# Patient Record
Sex: FEMALE | Race: OTHER | Employment: PART TIME | URBAN - METROPOLITAN AREA
[De-identification: names, ages, dates, MRNs, and addresses within clinical notes are randomized per-mention and may not be internally consistent; named-entity substitution may affect disease eponyms.]

---

## 2020-06-09 ENCOUNTER — HOSPITAL ENCOUNTER (EMERGENCY)
Age: 45
Discharge: HOME | End: 2020-06-09
Attending: EMERGENCY MEDICINE
Payer: SELF-PAY

## 2020-06-09 ENCOUNTER — APPOINTMENT (OUTPATIENT)
Dept: GENERAL RADIOLOGY | Age: 45
End: 2020-06-09
Attending: EMERGENCY MEDICINE
Payer: SELF-PAY

## 2020-06-09 VITALS
DIASTOLIC BLOOD PRESSURE: 75 MMHG | SYSTOLIC BLOOD PRESSURE: 127 MMHG | RESPIRATION RATE: 16 BRPM | TEMPERATURE: 99.8 F | HEIGHT: 60 IN | OXYGEN SATURATION: 99 % | HEART RATE: 97 BPM | WEIGHT: 140 LBS | BODY MASS INDEX: 27.48 KG/M2

## 2020-06-09 DIAGNOSIS — B34.9 VIRAL SYNDROME: Primary | ICD-10-CM

## 2020-06-09 DIAGNOSIS — R05.9 COUGH: ICD-10-CM

## 2020-06-09 LAB
ALBUMIN SERPL-MCNC: 3.1 G/DL (ref 3.5–5)
ALBUMIN/GLOB SERPL: 0.6 {RATIO} (ref 1.2–3.5)
ALP SERPL-CCNC: 94 U/L (ref 50–136)
ALT SERPL-CCNC: 18 U/L (ref 12–65)
ANION GAP SERPL CALC-SCNC: 5 MMOL/L (ref 7–16)
AST SERPL-CCNC: 26 U/L (ref 15–37)
BASOPHILS # BLD: 0 K/UL (ref 0–0.2)
BASOPHILS NFR BLD: 0 % (ref 0–2)
BILIRUB SERPL-MCNC: 0.2 MG/DL (ref 0.2–1.1)
BUN SERPL-MCNC: 14 MG/DL (ref 6–23)
CALCIUM SERPL-MCNC: 8.2 MG/DL (ref 8.3–10.4)
CHLORIDE SERPL-SCNC: 107 MMOL/L (ref 98–107)
CO2 SERPL-SCNC: 26 MMOL/L (ref 21–32)
CREAT SERPL-MCNC: 1.17 MG/DL (ref 0.6–1)
DIFFERENTIAL METHOD BLD: ABNORMAL
EOSINOPHIL # BLD: 0 K/UL (ref 0–0.8)
EOSINOPHIL NFR BLD: 0 % (ref 0.5–7.8)
ERYTHROCYTE [DISTWIDTH] IN BLOOD BY AUTOMATED COUNT: 16.5 % (ref 11.9–14.6)
GLOBULIN SER CALC-MCNC: 4.8 G/DL (ref 2.3–3.5)
GLUCOSE SERPL-MCNC: 107 MG/DL (ref 65–100)
HCT VFR BLD AUTO: 32.4 % (ref 35.8–46.3)
HGB BLD-MCNC: 9.6 G/DL (ref 11.7–15.4)
IMM GRANULOCYTES # BLD AUTO: 0 K/UL (ref 0–0.5)
IMM GRANULOCYTES NFR BLD AUTO: 0 % (ref 0–5)
LYMPHOCYTES # BLD: 0.9 K/UL (ref 0.5–4.6)
LYMPHOCYTES NFR BLD: 27 % (ref 13–44)
MCH RBC QN AUTO: 21.7 PG (ref 26.1–32.9)
MCHC RBC AUTO-ENTMCNC: 29.6 G/DL (ref 31.4–35)
MCV RBC AUTO: 73.1 FL (ref 79.6–97.8)
MONOCYTES # BLD: 0.2 K/UL (ref 0.1–1.3)
MONOCYTES NFR BLD: 5 % (ref 4–12)
NEUTS SEG # BLD: 2.3 K/UL (ref 1.7–8.2)
NEUTS SEG NFR BLD: 67 % (ref 43–78)
NRBC # BLD: 0 K/UL (ref 0–0.2)
PLATELET # BLD AUTO: 299 K/UL (ref 150–450)
PMV BLD AUTO: 8.9 FL (ref 9.4–12.3)
POTASSIUM SERPL-SCNC: 3.3 MMOL/L (ref 3.5–5.1)
PROT SERPL-MCNC: 7.9 G/DL (ref 6.3–8.2)
RBC # BLD AUTO: 4.43 M/UL (ref 4.05–5.2)
SODIUM SERPL-SCNC: 138 MMOL/L (ref 136–145)
WBC # BLD AUTO: 3.5 K/UL (ref 4.3–11.1)

## 2020-06-09 PROCEDURE — 71045 X-RAY EXAM CHEST 1 VIEW: CPT

## 2020-06-09 PROCEDURE — 99285 EMERGENCY DEPT VISIT HI MDM: CPT

## 2020-06-09 PROCEDURE — 80053 COMPREHEN METABOLIC PANEL: CPT

## 2020-06-09 PROCEDURE — 74011250637 HC RX REV CODE- 250/637: Performed by: EMERGENCY MEDICINE

## 2020-06-09 PROCEDURE — 93005 ELECTROCARDIOGRAM TRACING: CPT | Performed by: EMERGENCY MEDICINE

## 2020-06-09 PROCEDURE — 85025 COMPLETE CBC W/AUTO DIFF WBC: CPT

## 2020-06-09 RX ORDER — IBUPROFEN 800 MG/1
800 TABLET ORAL
Qty: 15 TAB | Refills: 0 | Status: SHIPPED | OUTPATIENT
Start: 2020-06-09 | End: 2020-06-14

## 2020-06-09 RX ORDER — BENZONATATE 200 MG/1
200 CAPSULE ORAL
Qty: 15 CAP | Refills: 0 | Status: SHIPPED | OUTPATIENT
Start: 2020-06-09 | End: 2020-06-14

## 2020-06-09 RX ORDER — BENZONATATE 100 MG/1
200 CAPSULE ORAL ONCE
Status: COMPLETED | OUTPATIENT
Start: 2020-06-09 | End: 2020-06-09

## 2020-06-09 RX ADMIN — BENZONATATE 200 MG: 100 CAPSULE ORAL at 20:56

## 2020-06-09 NOTE — ED TRIAGE NOTES
Pt arrives with complaints of fevers, non productive cough and body aches x 3 days. Has not taken temp just felt like fever. Took tylenol this morning.

## 2020-06-09 NOTE — ED PROVIDER NOTES
57-year-old female states 3-day history of nonproductive cough. She has a scratchy throat. Feels slightly hoarse. No sputum. Subjective fever and chills. She has had some change in taste. Poor appetite. No vomiting or diarrhea. No chest pain or particular shortness of breath. Has not been around anyone that has been sick. Past medical history negative. No primary care. The history is provided by the patient. A  was used. Cough   This is a new problem. The current episode started more than 2 days ago. The problem occurs every few minutes. The problem has not changed since onset. The cough is non-productive. Patient reports a subjective fever - was not measured. Associated symptoms include chills, sore throat and nausea. Pertinent negatives include no chest pain, no ear pain, no headaches, no rhinorrhea, no shortness of breath, no wheezing and no vomiting. Her past medical history does not include pneumonia, asthma or heart failure. No past medical history on file. No past surgical history on file. No family history on file.     Social History     Socioeconomic History    Marital status: Not on file     Spouse name: Not on file    Number of children: Not on file    Years of education: Not on file    Highest education level: Not on file   Occupational History    Not on file   Social Needs    Financial resource strain: Not on file    Food insecurity     Worry: Not on file     Inability: Not on file    Transportation needs     Medical: Not on file     Non-medical: Not on file   Tobacco Use    Smoking status: Never Smoker    Smokeless tobacco: Never Used   Substance and Sexual Activity    Alcohol use: Never     Frequency: Never    Drug use: Never    Sexual activity: Not on file   Lifestyle    Physical activity     Days per week: Not on file     Minutes per session: Not on file    Stress: Not on file   Relationships    Social connections     Talks on phone: Not on file     Gets together: Not on file     Attends Mormonism service: Not on file     Active member of club or organization: Not on file     Attends meetings of clubs or organizations: Not on file     Relationship status: Not on file    Intimate partner violence     Fear of current or ex partner: Not on file     Emotionally abused: Not on file     Physically abused: Not on file     Forced sexual activity: Not on file   Other Topics Concern    Not on file   Social History Narrative    Not on file         ALLERGIES: Patient has no known allergies. Review of Systems   Constitutional: Positive for chills. Negative for fever. HENT: Positive for sore throat. Negative for ear pain and rhinorrhea. Respiratory: Positive for cough. Negative for shortness of breath and wheezing. Cardiovascular: Negative for chest pain and palpitations. Gastrointestinal: Positive for nausea. Negative for abdominal pain, diarrhea and vomiting. Genitourinary: Negative for dysuria. Musculoskeletal: Negative for back pain and neck pain. Skin: Negative for color change and rash. Neurological: Negative for headaches. All other systems reviewed and are negative. Vitals:    06/09/20 1839   BP: 122/80   Pulse: 96   Resp: 16   Temp: 99.8 °F (37.7 °C)   SpO2: 98%   Weight: 63.5 kg (140 lb)   Height: 5' (1.524 m)            Physical Exam  Vitals signs and nursing note reviewed. Constitutional:       General: She is not in acute distress. Appearance: She is well-developed. HENT:      Head: Normocephalic and atraumatic. Right Ear: External ear normal.      Left Ear: External ear normal.      Mouth/Throat:      Pharynx: No oropharyngeal exudate. Eyes:      Conjunctiva/sclera: Conjunctivae normal.      Pupils: Pupils are equal, round, and reactive to light. Neck:      Musculoskeletal: Normal range of motion and neck supple. Cardiovascular:      Rate and Rhythm: Normal rate and regular rhythm.       Heart sounds: No murmur. Pulmonary:      Effort: No respiratory distress. Breath sounds: Normal breath sounds. Skin:     General: Skin is warm and dry. Neurological:      Mental Status: She is alert and oriented to person, place, and time. Gait: Gait normal.      Comments: Nl speech   Psychiatric:         Speech: Speech normal.          MDM  Number of Diagnoses or Management Options  Diagnosis management comments: Patient denies any sick contact. Will take chest x-ray and check screening lab work. Amount and/or Complexity of Data Reviewed  Clinical lab tests: ordered and reviewed  Tests in the radiology section of CPT®: ordered and reviewed  Independent visualization of images, tracings, or specimens: yes    Risk of Complications, Morbidity, and/or Mortality  Presenting problems: moderate  Diagnostic procedures: minimal  Management options: low    Patient Progress  Patient progress: stable         Procedures    Results Include:    Recent Results (from the past 24 hour(s))   CBC WITH AUTOMATED DIFF    Collection Time: 06/09/20  7:59 PM   Result Value Ref Range    WBC 3.5 (L) 4.3 - 11.1 K/uL    RBC 4.43 4.05 - 5.2 M/uL    HGB 9.6 (L) 11.7 - 15.4 g/dL    HCT 32.4 (L) 35.8 - 46.3 %    MCV 73.1 (L) 79.6 - 97.8 FL    MCH 21.7 (L) 26.1 - 32.9 PG    MCHC 29.6 (L) 31.4 - 35.0 g/dL    RDW 16.5 (H) 11.9 - 14.6 %    PLATELET 130 534 - 728 K/uL    MPV 8.9 (L) 9.4 - 12.3 FL    ABSOLUTE NRBC 0.00 0.0 - 0.2 K/uL    DF AUTOMATED      NEUTROPHILS 67 43 - 78 %    LYMPHOCYTES 27 13 - 44 %    MONOCYTES 5 4.0 - 12.0 %    EOSINOPHILS 0 (L) 0.5 - 7.8 %    BASOPHILS 0 0.0 - 2.0 %    IMMATURE GRANULOCYTES 0 0.0 - 5.0 %    ABS. NEUTROPHILS 2.3 1.7 - 8.2 K/UL    ABS. LYMPHOCYTES 0.9 0.5 - 4.6 K/UL    ABS. MONOCYTES 0.2 0.1 - 1.3 K/UL    ABS. EOSINOPHILS 0.0 0.0 - 0.8 K/UL    ABS. BASOPHILS 0.0 0.0 - 0.2 K/UL    ABS. IMM.  GRANS. 0.0 0.0 - 0.5 K/UL   METABOLIC PANEL, COMPREHENSIVE    Collection Time: 06/09/20  7:59 PM   Result Value Ref Range    Sodium 138 136 - 145 mmol/L    Potassium 3.3 (L) 3.5 - 5.1 mmol/L    Chloride 107 98 - 107 mmol/L    CO2 26 21 - 32 mmol/L    Anion gap 5 (L) 7 - 16 mmol/L    Glucose 107 (H) 65 - 100 mg/dL    BUN 14 6 - 23 MG/DL    Creatinine 1.17 (H) 0.6 - 1.0 MG/DL    GFR est AA >60 >60 ml/min/1.73m2    GFR est non-AA 53 (L) >60 ml/min/1.73m2    Calcium 8.2 (L) 8.3 - 10.4 MG/DL    Bilirubin, total 0.2 0.2 - 1.1 MG/DL    ALT (SGPT) 18 12 - 65 U/L    AST (SGOT) 26 15 - 37 U/L    Alk. phosphatase 94 50 - 136 U/L    Protein, total 7.9 6.3 - 8.2 g/dL    Albumin 3.1 (L) 3.5 - 5.0 g/dL    Globulin 4.8 (H) 2.3 - 3.5 g/dL    A-G Ratio 0.6 (L) 1.2 - 3.5       Xr Chest Port    Result Date: 6/9/2020  Portable chest x-ray CLINICAL INDICATION: Sore throat and cough FINDINGS: Single AP view of the chest submitted without comparison show the lungs to be slightly underexpanded but otherwise clear. No pleural effusion or pneumothorax. The cardiac silhouette and mediastinum are unremarkable. IMPRESSION: Slightly underexpanded lungs, otherwise no acute abnormality. Viral syndrome. Patient states extremely doubtful for COVID exposure but is willing to be tested.

## 2020-06-09 NOTE — LETTER
[address] [facility] EMERGENCY DEPT 
[address] [facility] 
[phone] Work/School Note Date: 6/9/2020 To Whom It May concern: 
 
Lamar Davison was seen and treated today in the emergency room by the following provider(s): 
Attending Provider: Silvina Wade MD.   
 
Lamar Davison may return to work on 6/13. Sincerely, Richard Jung MD

## 2020-06-10 LAB
ATRIAL RATE: 92 BPM
CALCULATED P AXIS, ECG09: 46 DEGREES
CALCULATED R AXIS, ECG10: 80 DEGREES
CALCULATED T AXIS, ECG11: 58 DEGREES
DIAGNOSIS, 93000: NORMAL
P-R INTERVAL, ECG05: 166 MS
Q-T INTERVAL, ECG07: 336 MS
QRS DURATION, ECG06: 68 MS
QTC CALCULATION (BEZET), ECG08: 415 MS
VENTRICULAR RATE, ECG03: 92 BPM

## 2020-06-10 NOTE — ED NOTES
I have reviewed discharge instructions with the patient. The patient verbalized understanding. Patient left ED via Discharge Method: ambulatory to Home with family. Opportunity for questions and clarification provided. Patient given 2 scripts. Pt in no acute distress at time of d/c        To continue your aftercare when you leave the hospital, you may receive an automated call from our care team to check in on how you are doing. This is a free service and part of our promise to provide the best care and service to meet your aftercare needs.  If you have questions, or wish to unsubscribe from this service please call [phone]. Thank you for Choosing our [facility] Emergency Department.

## 2020-06-10 NOTE — DISCHARGE INSTRUCTIONS
Tylenol or prescription for ibuprofen for achiness. Cough medication as needed. Recheck 2 to 3 days if not improving. Recheck sooner for fever shortness of breath or worse symptoms. Patient Education        Tos: Instrucciones de cuidado  Cough: Care Instructions  Instrucciones de cuidado    La tos es [place] de moreno cuerpo a algo que molesta en la garganta o las vías respiratorias. La tos puede ser provocada por muchas cosas. Usted podría toser debido a un resfriado o luis gripe, luis bronquitis o el asma. Fumar, el goteo posnasal, las alergias y el ácido estomacal que regresa a moreno garganta también pueden causar tos. La tos es un síntoma, no luis enfermedad. En la [employer], la tos cesa cuando desaparece la causa, arlen un resfriado. Puede mis algunas medidas en moreno hogar para toser menos y sentirse mejor. La atención de seguimiento es luis parte clave de moreno tratamiento y seguridad. Asegúrese de hacer y acudir a todas las citas, y llame a moreno médico si está teniendo problemas. También es luis buena idea saber los resultados de brannon exámenes y mantener luis lista de los medicamentos que oj. ¿Cómo puede cuidarse en el hogar? · Silvia abundante agua y otros líquidos. [place] ayuda a [place] mucosidad sea menos espesa y [place] la garganta seca o adolorida. La miel o el jugo de quyen en [place] o té podrían aliviar luis tos seca. · [place] medicamentos para la tos según las indicaciones de moreno médico.  · Eleve la misha sobre almohadas para ayudarle a respirar y aliviar la tos seca. · Pruebe pastillas para la tos para aliviar la garganta seca o adolorida. Las pastillas para la tos no detienen la tos. Las pastillas para la tos medicinales saborizadas no son mejores que las pastillas con sabor a curtis o los caramelos duros. · No fume. Evite el humo de tabaco ambiental. Si necesita ayuda para dejar de fumar, hable con moreno médico sobre programas y medicamentos para dejar de fumar.  Estos pueden aumentar brannon probabilidades de dejar el hábito para siempre. ¿Cuándo debe pedir ayuda? Llame [id number] en cualquier momento que considere que necesita atención de [place]. Por ejemplo, llame si:  · Tiene graves dificultades para respirar. Llame a moreno médico ahora mismo o busque atención médica inmediata si:  · Tose chico. · Jonne Round dificultades para respirar o estas Joaquin Sark. · Tiene fiebre nueva o más gem. · Tiene un salpullido nuevo. Preste especial atención a los cambios en moreno corby y asegúrese de comunicarse con moreno médico si:  · Moreno tos es más profunda o más frecuente que antes, especialmente si nota más mucosidad o un cambio en el color de la mucosidad. · Tiene nuevos síntomas, arlen dolor de garganta, dolor de oídos o dolor en los senos paranasales. · No mejora arlen se esperaba. ¿Dónde puede encontrar más información en inglés? Vaya a http://el-luke.info/  Zulma [id number] en la búsqueda para aprender más acerca de \"Tos: Instrucciones de cuidado. \"  Revisado: 24 febrero, 2020               Versión del contenido: 12.5  © 2006-2020 Healthwise, Incorporated. Las instrucciones de cuidado fueron adaptadas bajo licencia por [employer] (which disclaims liability or warranty for this information). Si usted tiene Brogan [place]vord afección médica o sobre estas instrucciones, siempre pregunte a moreno profesional de corby. Healthwise, Incorporated niega toda garantía o responsabilidad por moreno uso de esta información. Patient Education        Coronavirus ([id number]): Instrucciones de cuidado  Coronavirus ([id number]): Care Instructions  Generalidades  La enfermedad por coronavirus (COVID-19) es causada por un virus. Los síntomas pueden incluir [place], tos y falta de aire. Se transmite principalmente de persona a persona a través de las gotitas de la tos y los estornudos.  El virus también puede transmitirse cuando las personas están en contacto cercano con alguien que tiene la infección. La mayoría de las personas tienen síntomas leves y pueden cuidarse en moreno casa. Si brannon síntomas empeoran, podrían necesitar atención en un hospital. No hay ningún medicamento para combatir el virus. Es importante no transmitir el virus a otras personas. Si usted tiene COVID-19, use [place] facial toda vez que esté en presencia de [employer]. Usted necesita aislarse mientras está enfermo. [address] locales de [employer] dirán cuando ya no necesite estar aislado. Salga de moreno casa solamente si necesita obtener [employer]. La atención de seguimiento es luis parte clave de moreno tratamiento y seguridad. Asegúrese de hacer y acudir a todas las citas, y llame a moreno médico si está teniendo problemas. También es luis buena idea saber los resultados de brannon exámenes y mantener luis lista de los medicamentos que oj. ¿Cómo puede cuidarse en el hogar? · Descanse más de lo habitual. Puede ayudarle a sentirse mejor. · Silvia mucho líquido. [place] ayuda a [employer]Me se dubois perdido debido a la fiebre. [place] líquido también ayuda a aliviar la irritación en la garganta. [place], la sopa, el jugo de frutas y el té caliente con [place]a [place] son Beata Nicol opciones. · [place] acetaminofén (arlen Tylenol) para reducir la fiebre. También podría [employer]. Vannesa y siga todas las instrucciones de la [place]. · Pásese luis esponja por todo el cuerpo con agua entre tibia y fresca para aliviar la fiebre. No use agua fría ni hielo. · Use vaselina en la piel adolorida. [place] puede ayudar si la piel de alrededor de la nariz y los labios le arde por frotársela demasiado con pañuelos de papel. Consejos para el aislamiento  · Use luis cubierta facial de caitlyn cuando esté en presencia de [employer]. [place] puede ayudar a detener la propagación del virus cuando tose o estornuda. · Limite el contacto con las personas en moreno hogar.  Si es posible, quédese en luis habitación separada y use un baño separado. · Si tiene que salir de casa, evite las multitudes y trate de mantenerse al menos a 6 pies de distancia de otras personas. · Evite el contacto con mascotas y [place]. · Cúbrase la boca y la Chalmer Tolbert con un pañuelo cuando tosa o estornude. Luego tírelo a la basura de inmediato. · [place] con frecuencia, especialmente después de toser o estornudar. Use agua y [place], y fróteselas pritesh al menos 20 segundos. Si no tiene agua y jabón disponibles, use un desinfectante para dev a base de alcohol. · No comparta artículos personales en el hogar. Estos incluyen ropa de cama, toallas, tazas y vasos, y utensilios para comer. · Lave la ropa en el agua más caliente permitida para el tipo de caitlyn y séquela por completo. Está tessa jayleen la ropa de otras personas junto con la suya. · Limpie y desinfecte moreno hogar todos los días. Use limpiadores domésticos y toallitas o aerosoles desinfectantes. Tenga especial cuidado de limpiar las cosas que agarra con las [place]. Estas incluyen perillas de real, controles remotos, teléfonos y manijas del refrigerador y microondas. Y no olvide las encimeras, [place], fermin y teclados de computadora. ¿Cuándo debe pedir ayuda? Llame [id number] en cualquier momento que considere que necesita atención de [place]. Por ejemplo, llame si tiene síntomas potencialmente mortales, por ejemplo:  · Tiene grave dificultad para respirar. (No puede hablar en absoluto). · Tiene dolor o presión monika en el pecho. · Está seriamente mareado o aturdido. · Está confuso o no puede pensar con claridad. · Tiene un tinte [employer] bobbi y en los labios. · Se desmaya (pierde el conocimiento) o tiene muchas dificultades para despertarse. Llame a moreno médico ahora mismo o busque atención médica inmediata si:  · Tiene dificultad moderada para respirar. (No puede decir luis frase completa). · Tose con chico (más de alrededor de 1 cucharadita).   · Tiene señales de presión arterial baja. Estas incluyen sentirse mareado; estar demasiado débil para estar de pie; y Tosha Nettle la piel fría, pálida y [place]. Preste especial atención a los cambios en moreno corby y asegúrese de comunicarse con moreno médico si:  · Brannon síntomas Wally Gilford. · No mejora arlen se esperaba. Llame antes de ir al consultorio médico. Siga brannon instrucciones. Y use luis cubierta facial de caitlyn. Revisado: 8 [place], 2020               Versión del contenido: 12.5  © 1095-9841 Healthwise, Voyat. Las instrucciones de cuidado fueron adaptadas bajo licencia por [employer] (which disclaims liability or warranty for this information). Si usted tiene [place] afección médica o sobre estas instrucciones, siempre pregunte a moreno profesional de croby. fypio, Voy[employer] niega toda garantía o responsabilidad por moreno uso de esta información.

## 2020-06-10 NOTE — PROGRESS NOTES
Virtual  Interpretation with Dr. Freddy Oconnor.         Thank you,      Ginger Fabian,   [facility]  [address] [place] [address]  [phone] (phone)

## 2020-06-11 ENCOUNTER — PATIENT OUTREACH (OUTPATIENT)
Dept: CASE MANAGEMENT | Age: 45
End: 2020-06-11

## 2020-06-11 LAB — EMERGENT DISEASE PANEL, EDPR: DETECTED

## 2020-06-11 NOTE — PROGRESS NOTES
The patient was called for notification of a POSITIVE test result for COVID-19. The following information was given to the patient:    The COVID-19 test result was positive  Mild and stable symptoms are managed at home    Treatment of coronavirus does not require an antibiotic  Remain isolated for 10 days since symptoms first appeared AND at least 3 days have passed after recovery    Recovery is defined as resolution of fever without the use of fever-reducing medications with progressive improvement or resolution of other symptoms    Wash hands often with soap and water for at least 20 seconds or alternatively use hand  with at least 60% alcohol content  Cover coughs and sneezes  Wear a mask when around others if possible  Clean all high-touch surfaces every day, such as doorknobs and cellphones  Continually monitor symptoms. Contact your medical provider if symptoms are worsening, such as difficulty breathing  For more information visit the CDC website: DotProtection.gl     Positive results discussed with patient via language interrupter. All questions answered at this time.

## 2020-06-12 ENCOUNTER — PATIENT OUTREACH (OUTPATIENT)
Dept: CASE MANAGEMENT | Age: 45
End: 2020-06-12

## 2020-06-12 NOTE — PROGRESS NOTES
COVID-19 ED/Flu Clinic Initial Outreach Note    The patient was contacted regarding recent visit for viral symptoms. This author contacted the [facility] by telephone to perform post discharge call. Verified name and  with patient as identifiers. Provided introduction to self, and reason for call due to viral symptoms of infection and/or possible exposure to COVID-19. Patient presented to emergency department/flu clinic with Viral symptoms  Patient reported symptoms are improving  Due to no new or worsening sypmtoms the RN CTN/ACEBONY was not notified for escalation. Discussed exposure protocols and quarantine with CDC Guidelines What To Do If You Are Sick   The patient was given an opportunity for questions and concerns. Stay home except to get medical care  People who are mildly ill with COVID-19 are able to isolate at home during their illness. You should restrict activities outside your home, except for getting medical care. Do not go to work, school, or public areas. Avoid using public transportation, ride-sharing, or taxis. Separate yourself from other people and animals in your home  People: As much as possible, you should stay in a specific room and away from other people in your home. Also, you should use a separate bathroom, if available. Animals: You should restrict contact with pets and other animals while you are sick with COVID-19, just like you would around other people. Although there have not been reports of pets or other animals becoming sick with COVID-19, it is still recommended that people sick with COVID-19 limit contact with animals until more information is known about the virus. When possible, have another member of your household care for your animals while you are sick. If you are sick with COVID-19, avoid contact with your pet, including petting, snuggling, being kissed or licked, and sharing food.  If you must care for your pet or be around animals while you are sick, wash your hands before and after you interact with pets and wear a facemask. Call ahead before visiting your doctor  If you have a medical appointment, call the healthcare provider and tell them that you have or may have COVID-19. This will help the healthcare provider's office take steps to keep other people from getting infected or exposed. Wear a facemask  You should wear a facemask when you are around other people (e.g., sharing a room or vehicle) or pets and before you enter a healthcare provider's office. If you are not able to wear a facemask (for example, because it causes trouble breathing), then people who live with you should not stay in the same room with you, or they should wear a facemask if they enter your room. Cover your coughs and sneezes  Cover your mouth and nose with a tissue when you cough or sneeze. Throw used tissues in a lined trash can. Immediately wash your hands with soap and water for at least 20 seconds or, if soap and water are not available, clean your hands with an alcohol-based hand  that contains at least 60% alcohol. Clean your hands often  Wash your hands often with soap and water for at least 20 seconds, especially after blowing your nose, coughing, or sneezing; going to the bathroom; and before eating or preparing food. If soap and water are not readily available, use an alcohol-based hand  with at least 60% alcohol, covering all surfaces of your hands and rubbing them together until they feel dry. Soap and water are the best option if hands are visibly dirty. Avoid touching your eyes, nose, and mouth with unwashed hands. Avoid sharing personal household items  You should not share dishes, drinking glasses, cups, eating utensils, towels, or bedding with other people or pets in your home. After using these items, they should be washed thoroughly with soap and water.   Clean all high-touch surfaces everyday  High touch surfaces include counters, tabletops, doorknobs, bathroom fixtures, toilets, phones, keyboards, tablets, and bedside tables. Also, clean any surfaces that may have blood, stool, or body fluids on them. Use a household cleaning spray or wipe, according to the label instructions. Labels contain instructions for safe and effective use of the cleaning product including precautions you should take when applying the product, such as wearing gloves and making sure you have good ventilation during use of the product. Monitor your symptoms  Seek prompt medical attention if your illness is worsening (e.g., difficulty breathing). Before seeking care, call your healthcare provider and tell them that you have, or are being evaluated for, COVID-19. Put on a facemask before you enter the facility. These steps will help the healthcare provider's office to keep other people in the office or waiting room from getting infected or exposed. Ask your healthcare provider to call the local or [facility] health department. Persons who are placed under If you have a medical emergency and need to call 911, notify the dispatch personnel that you have, or are being evaluated for COVID-19. If possible, put on a facemask before emergency medical services arrive. The patient agrees to contact the [facility] exposure line [phone], local health department [facility] [address] [place] [address] ([phone]) and PCP office for questions related to their healthcare. Author provided contact information for future reference.     Patient/family/caregiver given information for [employer] and agrees to enroll: No Non English speaking    This interview was conducted with the assistance of [employer] Romansh interpretor

## 2020-06-12 NOTE — PROGRESS NOTES
[address] [facility] the patient for follow up call to ER visit for COVID 19 safety information. Left message requesting return call. No response by end of day will attempt again next business day.